# Patient Record
Sex: FEMALE
[De-identification: names, ages, dates, MRNs, and addresses within clinical notes are randomized per-mention and may not be internally consistent; named-entity substitution may affect disease eponyms.]

---

## 2018-07-11 PROBLEM — Z00.00 ENCOUNTER FOR PREVENTIVE HEALTH EXAMINATION: Status: ACTIVE | Noted: 2018-07-11

## 2018-08-06 ENCOUNTER — APPOINTMENT (OUTPATIENT)
Dept: NEPHROLOGY | Facility: CLINIC | Age: 83
End: 2018-08-06

## 2021-11-05 ENCOUNTER — NEW PATIENT COMPREHENSIVE (OUTPATIENT)
Dept: URBAN - METROPOLITAN AREA CLINIC 25 | Facility: CLINIC | Age: 86
End: 2021-11-05

## 2021-11-05 DIAGNOSIS — H52.4: ICD-10-CM

## 2021-11-05 DIAGNOSIS — Z96.1: ICD-10-CM

## 2021-11-05 PROCEDURE — 92499RSE RETINAL SCREENING ELECTIVE

## 2021-11-05 PROCEDURE — 92004 COMPRE OPH EXAM NEW PT 1/>: CPT

## 2021-11-05 PROCEDURE — 92015 DETERMINE REFRACTIVE STATE: CPT

## 2021-11-05 ASSESSMENT — TONOMETRY
OD_IOP_MMHG: 10
OS_IOP_MMHG: 10

## 2021-11-05 ASSESSMENT — KERATOMETRY
OD_AXISANGLE_DEGREES: 150
OD_K1POWER_DIOPTERS: 43.50
OD_AXISANGLE2_DEGREES: 60
OD_K2POWER_DIOPTERS: 44.50
OS_AXISANGLE2_DEGREES: 168
OS_K1POWER_DIOPTERS: 42.75
OS_K2POWER_DIOPTERS: 44.25
OS_AXISANGLE_DEGREES: 78

## 2021-11-05 ASSESSMENT — VISUAL ACUITY
OD_SC: 20/50-2
OS_SC: 20/60

## 2022-12-15 ENCOUNTER — APPOINTMENT (OUTPATIENT)
Dept: HEART AND VASCULAR | Facility: CLINIC | Age: 87
End: 2022-12-15

## 2022-12-20 NOTE — PATIENT DISCUSSION
PT elects CV at this time no van or med clearance will put drops through pharmacy and call if they are to expensive.

## 2023-01-12 NOTE — PATIENT DISCUSSION
The patient feels that the cataract is significantly impacting daily activities and has elected cataract surgery. The risks, benefits, and alternatives to surgery were discussed. The patient elects to proceed with surgery. [Back Pain] : ~T back pain [NI] : Endocrine [Nl] : Hematologic/Lymphatic [Change in Activity] : no change in activity [Fever Above 102] : no fever [Malaise] : no malaise [Rash] : no rash [Cough] : no cough [Joint Pains] : no arthralgias [Joint Swelling] : no joint swelling